# Patient Record
Sex: FEMALE | Race: WHITE
[De-identification: names, ages, dates, MRNs, and addresses within clinical notes are randomized per-mention and may not be internally consistent; named-entity substitution may affect disease eponyms.]

---

## 2018-07-25 ENCOUNTER — HOSPITAL ENCOUNTER (OUTPATIENT)
Dept: HOSPITAL 47 - RADUSWWP | Age: 33
Discharge: HOME | End: 2018-07-25
Payer: COMMERCIAL

## 2018-07-25 DIAGNOSIS — Z3A.22: ICD-10-CM

## 2018-07-25 DIAGNOSIS — Z36.9: Primary | ICD-10-CM

## 2018-07-25 PROCEDURE — 76811 OB US DETAILED SNGL FETUS: CPT

## 2018-07-26 NOTE — US
EXAMINATION TYPE: US OB fetal anatomy transabd

 

DATE OF EXAM: 7/25/2018

 

COMPARISON: NONE

 

HISTORY: Z3A.22 22 weeks gestation of pregnancy

 

TECHNIQUE:

 

EXAM MEASUREMENTS:

 

GESTATIONAL AGE / DATING

Physician Established: (22 weeks/3 days)

** EDC:  11/24/2018

Dates by LMP:  (22 weeks/3 days)

** EDC: 11/24/2018

Dates by First Scan: No prior

Dates by Current Scan for:  (22 weeks/4 days)

** EDC: 11/25/2018

 

FETAL SURVEY

IUP:  Single

PLACENTA: Posterior     

PREVIA: Low Lying   

** KATHYA: 12.3 cm Normal

CERVICAL LENGTH (transabdominal: norm > 3.0cm): 3.6 cm

 

 

FETAL BIOMETRY

PRESENTATION:   Breech

 

BPD: 5.4 cm

**  22 weeks / 4 days

HC: 20.3 cm

**  22 weeks / 4 days

AC: 17.3 cm

**  22 weeks / 2 days

FL: 3.8 cm

**  22 weeks / 2 days

ESTIMATED FETAL WEIGHT IN GRAMS:  487 grams

ESTIMATED FETAL WEIGHT IN LBS/OZ:  1 lbs. 1 oz. 

WEIGHT PERCENTAGE BASED ON ESTABLISHED DATE:  28 %

** HC/AC:  1.17  Normal 

** FL/AC:  22 Normal

HEART RATE:  153 bpm 

RHYTHM: Normal

 

ANATOMY SEEN (within normal limits): 

* Lateral Vent (< 1 cm) 0.6 cm

* Cisterna Magna (< 1.1 cm) 0.6 cm

* Nuchal Fold (< 0.6 cm) 0.2 cm

* Cerebellum (varies with age) 2.3 cm

Choroid Plexus (bilateral)

Midline Falx 

Cavus Septi Pellucidi   

Four Chamber Heart

Outflow tracts:  LVOT/RVOT

Stomach

Situs

Nose / Lips 

Diaphragm 

Kidneys (bilateral) 

Bladder

Cord Insert 

Three Vessel Cord 

Longitudinal Spine 

Transverse Spine 

Arms (bilateral)

Legs (bilateral) 

 

 

 

 

 

 

 

IMPRESSION:

Single, viable IUP/ No abnormality visualized with anatomy at this time/ Low-lying placenta